# Patient Record
Sex: FEMALE | Race: WHITE | Employment: FULL TIME | URBAN - METROPOLITAN AREA
[De-identification: names, ages, dates, MRNs, and addresses within clinical notes are randomized per-mention and may not be internally consistent; named-entity substitution may affect disease eponyms.]

---

## 2022-04-15 ENCOUNTER — NURSE NAVIGATOR (OUTPATIENT)
Dept: CASE MANAGEMENT | Age: 67
End: 2022-04-15

## 2022-04-15 NOTE — NURSE NAVIGATOR
[address] [facility]   Breast Navigator Encounter    Name: Ronak Palma  Age: 61 y.o.  : 1955  Diagnosis: Right breast IDC; ER+/HI+/HER2-    Encounter type:  [x]Initial Navigator Encounter  []Patient Initiated  []Navigator Follow-up []Pre-op  []Post-op []Check-in Prior to First Treatment []Treatment Modality Change   []Other:     Narrative:   Pt scheduled to see Dr. Aiden Altman on Monday, , at 1pm. Per records from Dr. Chauhan [facility] office, it appears Oncotype was ordered on . Called [employer] to inquire about expected result time. Rep states that results are expected on . She states they received the specimen on  but are awaiting auth from the ordering provider's office. Per rep, this request was sent to Dr. Chauhan [facility] office this morning. Called pt to discuss rescheduling to a date once Oncotype is back. No answer at this time. Left VM.        Interdisciplinary Team:  Med-Onc: Dr. Samir Moore MD  Surg-Onc: Dr. Snehal Holloway MD  Rad-Onc:  Plastics:  :   Nurse Navigator:  Ty Bob, SELVIN HopperN, RN, VIA [facility]  Breast Cancer Nurse [address] [place] [address] Nw  W: [phone]  F: [phone]  [email or web address]   Good Help to Those in [facility]

## 2022-04-18 ENCOUNTER — NURSE NAVIGATOR (OUTPATIENT)
Dept: CASE MANAGEMENT | Age: 67
End: 2022-04-18

## 2022-04-18 NOTE — NURSE NAVIGATOR
[employer]  Breast Navigator Encounter    Name: Linette Ortiz  Age: 61 y.o.  : 1955  Diagnosis: Right breast IDC; ER+/MO+/HER2-    Encounter type:  [x]Initial Navigator Encounter  []Patient Initiated  [x]Navigator Follow-up []Pre-op  []Post-op []Check-in Prior to First Treatment []Treatment Modality Change   []Other:     Narrative:   Pt returned call this morning. Introduced self and discussed ordered Oncotype and upcoming med/onc consult. She verbalizes understanding and is okay with rescheduling appointment for when Oncotype result is back. Appointment with Dr. Faith Laura rescheduled to  at 4:15pm. Pt updated by Erica Carr RN.        Interdisciplinary Team:  Med-Onc: Dr. Ailyn Robles MD  Surg-Onc: Dr. Martin Marino MD  Rad-Onc:  Plastics:  :   Nurse Navigator:  Jelly Mckeon, SELVIN SantosN, RN, VIA [facility]  Breast Cancer Nurse [address] [place] [address] Nw  W: [phone]  F: [phone]  [email or web address]   Good Help to Those in [facility]

## 2022-04-26 ENCOUNTER — HOSPITAL ENCOUNTER (OUTPATIENT)
Dept: RADIATION THERAPY | Age: 67
Discharge: HOME | End: 2022-04-26

## 2022-04-26 VITALS — BODY MASS INDEX: 18.83 KG/M2 | HEIGHT: 65 IN | WEIGHT: 113 LBS

## 2022-05-10 ENCOUNTER — OFFICE VISIT (OUTPATIENT)
Dept: ONCOLOGY | Age: 67
End: 2022-05-10
Payer: COMMERCIAL

## 2022-05-10 ENCOUNTER — DOCUMENTATION ONLY (OUTPATIENT)
Dept: ONCOLOGY | Age: 67
End: 2022-05-10

## 2022-05-10 VITALS
WEIGHT: 116 LBS | BODY MASS INDEX: 19.33 KG/M2 | HEART RATE: 60 BPM | RESPIRATION RATE: 18 BRPM | HEIGHT: 65 IN | SYSTOLIC BLOOD PRESSURE: 141 MMHG | OXYGEN SATURATION: 97 % | DIASTOLIC BLOOD PRESSURE: 82 MMHG | TEMPERATURE: 98 F

## 2022-05-10 DIAGNOSIS — Z17.0 MALIGNANT NEOPLASM OF CENTRAL PORTION OF RIGHT BREAST IN FEMALE, ESTROGEN RECEPTOR POSITIVE (HCC): Primary | ICD-10-CM

## 2022-05-10 DIAGNOSIS — C50.111 MALIGNANT NEOPLASM OF CENTRAL PORTION OF RIGHT BREAST IN FEMALE, ESTROGEN RECEPTOR POSITIVE (HCC): Primary | ICD-10-CM

## 2022-05-10 PROCEDURE — 99204 OFFICE O/P NEW MOD 45 MIN: CPT | Performed by: INTERNAL MEDICINE

## 2022-05-10 RX ORDER — ANASTROZOLE 1 MG/1
1 TABLET ORAL DAILY
Qty: 90 TABLET | Refills: 3 | Status: SHIPPED | OUTPATIENT
Start: 2022-05-10

## 2022-05-10 NOTE — PATIENT INSTRUCTIONS
Anastrozole (By mouth)   Anastrozole (an-AS-troe-zole)  Treats breast cancer. Brand Name(s): Arimidex   There may be other brand names for this medicine. When This Medicine Should Not Be Used: This medicine is not right for everyone. Do not use it if you had an allergic reaction to anastrozole, if you are pregnant, or if you have not stopped menstruating (premenopausal). How to Use This Medicine:   Tablet  · Medicines used to treat cancer are very strong and can have many side effects. Before receiving this medicine, make sure you understand all the risks and benefits. It is important for you to work closely with your doctor during your treatment. · Your doctor will tell you how much medicine to use. Do not use more than directed. · Read and follow the patient instructions that come with this medicine. Talk to your doctor or pharmacist if you have any questions. · Missed dose: Take a dose as soon as you remember. If it is almost time for your next dose, wait until then and take a regular dose. Do not take extra medicine to make up for a missed dose. · If you vomit after taking your medicine, call your doctor or pharmacist for instructions. · Store the medicine in a closed container at room temperature, away from heat, moisture, and direct light. · Ask your pharmacist, doctor, or health caregiver about the best way to dispose of any leftover medicine after you have finished your treatment. You will also need to throw away old medicine after the expiration date has passed. Drugs and Foods to Avoid:   Ask your doctor or pharmacist before using any other medicine, including over-the-counter medicines, vitamins, and herbal products. · Some medicines can affect how anastrozole works.  Tell your doctor if you are taking any of the following:   ¨ Medicine that contains estrogen  ¨ Tamoxifen  Warnings While Using This Medicine:   · Pregnancy after menopause is not likely, but if you think you could be pregnant, tell your doctor. This medicine could harm an unborn baby. · Tell your doctor if you are breastfeeding, or if you have liver disease, bone problems (such as osteoporosis), high cholesterol, or heart disease. · This medicine may cause the following problems:   ¨ Increased risk for weak bones or osteoporosis  ¨ Increased cholesterol levels  ¨ Increased risk for heart attack or stroke  · Your doctor will do lab tests at regular visits to check on the effects of this medicine. Keep all appointments. · Cancer medicine can cause nausea or vomiting, sometimes even after you receive medicine to prevent these effects. Ask your doctor or nurse about other ways to control any nausea or vomiting that might happen. · Keep all medicine out of the reach of children. Never share your medicine with anyone. Possible Side Effects While Using This Medicine:   Call your doctor right away if you notice any of these side effects:  · Allergic reaction: Itching or hives, swelling in your face or hands, swelling or tingling in your mouth or throat, chest tightness, trouble breathing  · Back, bone, joint, or muscle pain  · Blistering, peeling, or red skin rash  · Chest pain or shortness of breath  · Fever, chills, cough, sore throat, and body aches  · Numbness, tingling, or burning pain in your hands, arms, legs, or feet  · Rapid weight gain, or swelling in your hands, ankles, or feet  · Severe diarrhea, nausea, or vomiting  · Vaginal bleeding or discharge  · Yellowing of your skin or the whites of your eyes  If you notice these less serious side effects, talk with your doctor:   · Breast pain  · Dizziness, headache, tiredness, or weakness  · Mood changes, anxiety, or irritability  · Trouble sleeping  · Warmth or redness in your face, neck, arms, or upper chest  If you notice other side effects that you think are caused by this medicine, tell your doctor. Call your doctor for medical advice about side effects.  You may report side effects to FDA at 5-770-FDA-0566  © 2017 [facility] [place][note 2] Information is for End User's use only and may not be sold, redistributed or otherwise used for commercial purposes. The above information is an  only. It is not intended as medical advice for individual conditions or treatments. Talk to your doctor, nurse or pharmacist before following any medical regimen to see if it is safe and effective for you.

## 2022-05-10 NOTE — PROGRESS NOTES
[facility] [place] [address] [address] [facility] [address] [facility]  Linh Salamanca: [phone]  F: [phone]      Reason for Visit:   Ethel Villafana is a 61 y.o. female who is seen in consultation at the request of Dr. Tiana Vaz for evaluation of therapy for breast cancer    Rad onc: Dr. Verito Cook    Treatment History:   · 1/24/22 right breast 12:00, 8cmfn, core bx:  IDC, 4 mm, gr 2, ER + at 93%, RI + 84%, HER 2 negative (IHC 1+, FISH ratio 1; sig/cell 2.2), ki67 40%  · 2/11/22:  invitae genetic testing:  Negative (56 genes)  · 3/31/22 right breast lumpectomy:  IDC, 1.3 cm, gr 1, 0/4 LN, pT1c pN0 cM0  · oncotype DX = 6, 3% RR    History of Present Illness: An abnormal mammogram led to the path above    FH: mother, MGM, maternal aunt with breast cancer; no ovarian, pancreas cancer; prostate cancer in her father    Past Medical History:   Diagnosis Date    Cancer [facility])       History reviewed. No pertinent surgical history. Social History     Tobacco Use    Smoking status: Never Smoker    Smokeless tobacco: Never Used   Substance Use Topics    Alcohol use: Not on file      History reviewed. No pertinent family history. Current Outpatient Medications   Medication Sig    docosahexaenoic acid/epa (FISH OIL PO) Take 1 Capsule by mouth daily.  Flaxseed Oil oil 1 Capsule by Does Not Apply route daily.  calcium carbonate (CALCIUM 500 PO) Take 1 Tablet by mouth daily. No current facility-administered medications for this visit. No Known Allergies     Review of Systems: A complete review of systems was obtained, negative except as described above.     Physical Exam:     Visit Vitals  BP (!) 141/82   Pulse 60   Temp 98 °F (36.7 °C) (Temporal)   Resp 18   Ht 5' 5\" (1.651 m)   Wt 116 lb (52.6 kg)   SpO2 97%   BMI 19.30 kg/m²     ECOG PS: 0    Constitutional: Appears well-developed and well-nourished in no apparent distress      Mental status: Alert and awake, Oriented to person/place/time, Able to follow commands    Eyes: EOM normal, Sclera normal, No visible ocular discharge    HENT: Normocephalic, atraumatic    Mouth/Throat: Moist mucous membranes    External Ears normal    Neck: No visualized mass    Pulmonary/Chest: Respiratory effort normal, No visualized signs of difficulty breathing or respiratory distress    Musculoskeletal: Normal gait with no signs of ataxia, Normal range of motion of neck    Neurological: No facial asymmetry (Cranial nerve 7 motor function), No gaze palsy    Skin: No significant exanthematous lesions or discoloration noted on facial skin    Psychiatric: Normal affect, No hallucinations               Results:   No results found for: WBC, HGB, HCT, PLT, MCV, ANEU, HGBPOC, HCTPOC, HGBEXT, HCTEXT, PLTEXT  No results found for: NA, K, CL, CO2, GLU, BUN, CREA, GFRAA, GFRNA, CA, NAPOC, KPOCT, CLPOC, GLUCPOC, IBUN, CREAPOC, ICAI  No results found for: TBILI, ALT, AP, TP, ALB, GLOB    3/8/22 MRI bx  Not able to identify area in L breast, follow up MRI in 6-12 months    2/8/22 MRI breast bilateral  Right:  1.3 cm mass upper central breast  Left:  0.9 cm inner central aspect of the L breast    Records reviewed and summarized above. Pathology report(s) reviewed above. Radiology report(s) reviewed above. Assessment/plan:   1. Right breast central IDC, 1.3 cm, gr 1, 0/4, ER +, NH +, HER 2 negative, stage IA both anatomic and prognostic  oncotype Dx = 6    We explained to the patient that the goal of systemic adjuvant therapy is to improve the chances for cure and decrease the risk of relapse. We explained why a patient can have microscopic cancer spread now even though physical examination, laboratory studies and imaging studies are negative for cancer. We explained that the same treatments used now as adjuvant or preventive treatments rarely if ever are curative in women who develop metastases. We discussed the potential value of OncotypeDx testing.  The patient was told that this test uses genetic information from the patients own breast cancer tissue to estimate the ten year risk of distant metastases if she takes tamoxifen. The patient was also told that this test is most appropriate in patients with node-negative breast cancer that is estrogen-receptor positive. Emerging data suggest it can also be helpful in women with 1-3 postive nodes. The patient was told that this test can help in decisions concerning the potential benefits of chemotherapy given in addition to hormone therapy like tamoxifen. She was also told that not all insurers reimburse for this test and that we would contact her insurer ahead of time to find out about reimbursement as the cost of the test is about $4,000. In addition we discussed the Jack and Jakeâ€™s trial which uses the OncotypeDx test to determine risk of recurrence. The patient was told that for low risk patients chemotherapy was not recommended, that for patients at intermediate risk there was a randomization to chemotherapy or not, and that for high-risk patients chemotherapy was recommended. The trial showed that for most patients, there was not a benefit of chemotherapy in the intermediate group (< 26 RS if > 48years old). Low oncotype, no indication for chemo    The risks and benefits of tamoxifen were discussed in detail and the patient was informed of the following: Risks include a 1% risk of endometrial cancer for postmenopausal women treated for five years but no (or a minimally increased) risk in premenopausal women and that most women who develop tamoxifen-associated endometrial cancer can be cured. Any bleeding in a postmenopausal woman should be reported to a health care professional. There is also a 1% risk of blood clots (thromboembolism) that can be fatal. All patients irrespective of age who take tamoxifen and who have not had a hysterectomy should have a pelvic exam and Pap smear yearly.  Tamoxifen increases the risk of cataract formation and on rare occasions has caused retinal damage: an eye exam is recommended yearly. Other risks include vaginal discharge or dryness, the development or worsening of hot flashes or vasomotor symptoms, and bone loss in premenopausal women. There is excellent evidence that tamoxifen does not increase risk of depression, cause weight gain or have a major effect on sexual function. Available data suggests little or no effect on cognitive function. Benefits include a lowering of cholesterol and a reduction in the rate of bone loss for postmenopausal woman. Any other symptoms should be reported. The risks and benefits of aromatase inhibitors (anastrozole, letrozole, and exemestane) were discussed in detail and the patient was informed of the following: Risks include the development of painful muscles and joints (arthralgia/myalgia) and bone loss. Muscle and joint pain can be severe but rarely result in any tissue damage; symptoms usually resolve in several weeks when the medication is stopped. Bone loss is common and a bone density test is recommended as a baseline and then yearly to every several years depending on initial results. The risk of fractures is increased by a few percent in patients taking these drugs, but careful monitoring of bone density and using bone protecting agents when indicated can minimize these risks. Unlike tamoxifen there is no increased risk of blood clots or endometrial cancer. AIs can cause or worsen vaginal dryness but women using these drugs should not use vaginal estrogen preparations for these symptoms. AIs can also cause or increase hot flashes. Any other symptoms should be reported. Follow-up after early breast cancer was discussed. I recommend follow-up as defined by the American Society of Clinical Oncology and [place].  This includes a visit to a health care professional every 3-6 months for 3 years, then every 6-12 months for 2 years, and then yearly as well as mammograms yearly. She will get her dexa soon with Dr. Vanessa Peña    After this discussion, she is agreeable to anastrozole 1 mg daily, rx in. She will start this 1 week after the completion of XRT. rx in.        2. Emotional well being:  She has excellent support and is coping well with her disease    3. Uterine polyp: to be removed by Dr. Vanessa Peña    4. L breast lesion: To be followed on repeat MRI    I appreciate the opportunity to participate in Ms. Skye Sharpe's care. Signed By: Mnaav Nieto MD      No orders of the defined types were placed in this encounter.

## 2022-05-11 NOTE — PROGRESS NOTES
Oncology Social Work  Psychosocial Assessment    Reason for Assessment:      [] Social Work Referral [x] Initial Assessment [x] New Diagnosis [] Other    Advance Care Planning: none on file  No flowsheet data found. Sources of Information:    [x]Patient  []Family  [x]Staff  [x]Medical Record    Mental Status:    [x]Alert  []Lethargic  []Unresponsive   [] Unable to assess   Oriented to:  [x]Person  [x]Place  [x]Time  [x]Situation      Relationship Status:  []Single  [x]  []Significant Other/Life Partner  []  []  []    Living Circumstances:  []Lives Alone  [x]Family/Significant Other in Household  []Roommates  []Children in the Home  []Paid Caregivers  []Assisted Living Facility/Group Home  []Skilled [address]  []Homeless  []Incarcerated  []Environmental/Care Concerns  []Other:    Employment Status:  [x]Employed Full-time []Employed Part-time []Homemaker  [] Retired [] Short-Term Disability [] [facility]  [] Unemployed   [] SSI   [] SSDI  [] Self-Employment    Barriers to Learning:    []Language  []Developmental  []Cognitive  []Altered Mental Status  []Visual/Hearing Impairment  []Unable to Read/Write  []Motivational  [] Challenges Understanding Medical Jargon [x]No Barriers Identified      Financial/Legal Concerns:    []Uninsured  []Limited Income/Resources  []Non-Citizen  []Food Insecurity [x]No Concerns Identified   []Other:    Caodaism/Spiritual/Existential:  Does patient have any spiritual or Jehovah's witness beliefs? [x] Yes [] No  Is the patient involved in a spiritual, erin or Jehovah's witness community?  [x] Yes [] No  Patient expressing spiritual/existential angst? [] Yes [x] No  Notes: [facility]:    Identified Support Person/Group: M[place] () & Millie Larsen (daughter)  [x]Strong  []Fair  []Limited    Coping with Illness:   [x]  Coping Well  [] Challenges Coping with Serious Illness [] Situational Depression [] Situational Anxiety [] Anticipatory Grief  [] Recent Loss [] Caregiver [place]            Narrative: Patient here for consultation with Dr. Jayce Torres for the management of breast cancer. Met with patient to introduce oncology social work role and supports. She lives in her private residence with her  Kathy Mcguire) and their adult daughter (Carmen Jose). She works as an . She has health insurance and adequate transportation to appointments if needed. Patient is actively coping with diagnosis. She feels well supported by her  and daughter. Those are the only two people she has told about her breast cancer diagnosis. She is a [employer] and attends [employer]. She tells me this diagnosis has encouraged her examine [facility]". She feels this self-reflection has been a positive outcome. Reviewed barriers to care and none identified at this time. She denied need for support resources. Encouraged her to contact me should she need psychosocial support. She voiced understanding and appreciation. Plan: Patient encouraged to contact social work for psychosocial support as needed. Referral/Handouts:        Thank you,  Glenda Elizabeth LCSW

## 2022-09-13 ENCOUNTER — OFFICE VISIT (OUTPATIENT)
Dept: ONCOLOGY | Age: 67
End: 2022-09-13
Payer: COMMERCIAL

## 2022-09-13 VITALS
TEMPERATURE: 98 F | SYSTOLIC BLOOD PRESSURE: 129 MMHG | RESPIRATION RATE: 18 BRPM | BODY MASS INDEX: 19.56 KG/M2 | HEIGHT: 65 IN | HEART RATE: 81 BPM | DIASTOLIC BLOOD PRESSURE: 78 MMHG | OXYGEN SATURATION: 96 % | WEIGHT: 117.4 LBS

## 2022-09-13 DIAGNOSIS — Z17.0 MALIGNANT NEOPLASM OF CENTRAL PORTION OF RIGHT BREAST IN FEMALE, ESTROGEN RECEPTOR POSITIVE (HCC): Primary | ICD-10-CM

## 2022-09-13 DIAGNOSIS — C50.111 MALIGNANT NEOPLASM OF CENTRAL PORTION OF RIGHT BREAST IN FEMALE, ESTROGEN RECEPTOR POSITIVE (HCC): Primary | ICD-10-CM

## 2022-09-13 PROCEDURE — 99214 OFFICE O/P EST MOD 30 MIN: CPT | Performed by: INTERNAL MEDICINE

## 2022-09-13 PROCEDURE — 1123F ACP DISCUSS/DSCN MKR DOCD: CPT | Performed by: INTERNAL MEDICINE

## 2022-09-13 NOTE — PROGRESS NOTES
Antoine Duane is a 74 y.o. female Follow up for the evaluation of breast cancer. 1. Have you been to the ER, urgent care clinic since your last visit? Hospitalized since your last visit? No    2. Have you seen or consulted any other health care providers outside of the [address] [place] [address] since your last visit? Include any pap smears or colon screening.  No

## 2022-09-13 NOTE — PROGRESS NOTES
[facility] [place] at [address], [address] [facility]  W: [phone]  F: [phone]      Reason for Visit:   Maya Berkowitz is a 74 y.o. female who is seen in follow up for breast cancer    Breast Surgeon: Dr. Núñez Brain onc: Dr. Royanne Klinefelter    Treatment History:   1/24/22 right breast 12:00, 8cmfn, core bx:  IDC, 4 mm, gr 2, ER + at 93%, [place] + 84%, HER 2 negative (IHC 1+, FISH ratio 1; sig/cell 2.2), ki67 40%  2/11/22:  invitae genetic testing:  Negative (56 genes)  3/31/22 right breast lumpectomy:  IDC, 1.3 cm, gr 1, 0/4 LN, pT1c pN0 cM0  oncotype DX = 6, 3% RR  XRT completed 6/7/22  Anastrozole 6/28/22 - current    History of Present Illness: An abnormal mammogram led to the path above    Interval Hx: Patient presents today for follow up on anastrozole. Reports gr 1 fatigue, gr 1 pain to back upon waking rated 3/10. FH: mother, MGM, maternal aunt with breast cancer; no ovarian, pancreas cancer; prostate cancer in her father    Past Medical History:   Diagnosis Date    Cancer [facility])       History reviewed. No pertinent surgical history. Social History     Tobacco Use    Smoking status: Never    Smokeless tobacco: Never   Substance Use Topics    Alcohol use: Not on file      History reviewed. No pertinent family history. Current Outpatient Medications   Medication Sig    anastrozole (ARIMIDEX) 1 mg tablet Take 1 mg by mouth daily. docosahexaenoic acid/epa (FISH OIL PO) Take 1 Capsule by mouth daily. Flaxseed Oil oil 1 Capsule by Does Not Apply route daily. calcium carbonate (CALCIUM 500 PO) Take 1 Tablet by mouth daily. No current facility-administered medications for this visit. No Known Allergies     Review of Systems: A complete review of systems was obtained, negative except as described above.     Physical Exam:     Visit Vitals  /78   Pulse 81   Temp 98 °F (36.7 °C) (Temporal)   Resp 18   Ht 5' 5\" (1.651 m)   Wt 117 lb 6.4 oz (53.3 kg) SpO2 96%   BMI 19.54 kg/m²     ECOG PS: 0    Constitutional: Appears well-developed and well-nourished in no apparent distress      Mental status: Alert and awake, Oriented to person/place/time, Able to follow commands    Eyes: EOM normal, Sclera normal, No visible ocular discharge    HENT: Normocephalic, atraumatic    Mouth/Throat: Moist mucous membranes    External Ears normal    Neck: No visualized mass    Pulmonary/Chest: Respiratory effort normal, No visualized signs of difficulty breathing or respiratory distress    Musculoskeletal: Normal gait with no signs of ataxia, Normal range of motion of neck    Neurological: No facial asymmetry (Cranial nerve 7 motor function), No gaze palsy    Skin: No significant exanthematous lesions or discoloration noted on facial skin    Psychiatric: Normal affect, No hallucinations     Results:   No results found for: WBC, HGB, HCT, PLT, MCV, ANEU, HGBPOC, HCTPOC, HGBEXT, HCTEXT, PLTEXT, HGBEXT, HCTEXT, PLTEXT  No results found for: NA, K, CL, CO2, GLU, BUN, CREA, GFRAA, GFRNA, CA, NAPOC, KPOCT, CLPOC, GLUCPOC, IBUN, CREAPOC, ICAI  No results found for: TBILI, ALT, AP, TP, ALB, GLOB    3/8/22 MRI bx  Not able to identify area in L breast, follow up MRI in 6-12 months    2/8/22 MRI breast bilateral  Right:  1.3 cm mass upper central breast  Left:  0.9 cm inner central aspect of the L breast    Records reviewed and summarized above. Pathology report(s) reviewed above. Radiology report(s) reviewed above. Assessment/plan:   1. 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 She is agreeable to anastrozole 1 mg daily. She started this 6/28/22 and tolerating well with mild hot flashes and joint pain. Continues to follow up with Dr. Amara Pandey. Mammogram scheduled 12/2022.      DEXA with Dr. Robert Henry 5/18/22 was normal.     Follow-up after early breast cancer was discussed. I recommend follow-up as defined by the American Society of Clinical Oncology and [facility]. This includes a visit to a health care professional every 3-6 months for 3 years, then every 6-12 months for 2 years, and then yearly as well as mammograms yearly. 2. Emotional well being:  She has excellent support and is coping well with her disease    3. Uterine polyp: removed by Dr. Connie Agudelo 5/2022 and per patient benign. Will request records. 4. L breast lesion: To be followed on repeat MRI 12/2022    5. Hot flashes: worsened on AI; tolerable    6. Arthralgias: due to AI but tolerable; discussed tart cherry juice     I personally saw and evaluated the patient and performed the entire medical decision making. The history, physical exam, and documentation were performed by Hernandez Callahan NP. I reviewed and verified the above documentation and modified it as needed. Right breast cancer, ER +, on anastrozole, NATHAN, continue, having mild joint pain, advised tart cherry juice. Will order breast MRI for Dec with her mammogram to follow up the left breast.  Dexa was normal.  RTC 6 months    I appreciate the opportunity to participate in Ms. Vaughn Holding Annemarie's care. Signed By: Jenise Us MD      No orders of the defined types were placed in this encounter.

## 2022-12-19 ENCOUNTER — HOSPITAL ENCOUNTER (OUTPATIENT)
Dept: MRI IMAGING | Age: 67
Discharge: HOME | End: 2022-12-19
Attending: NURSE PRACTITIONER
Payer: COMMERCIAL

## 2022-12-19 VITALS — BODY MASS INDEX: 19.3 KG/M2 | WEIGHT: 116 LBS

## 2022-12-19 DIAGNOSIS — C50.111 MALIGNANT NEOPLASM OF CENTRAL PORTION OF RIGHT BREAST IN FEMALE, ESTROGEN RECEPTOR POSITIVE (HCC): ICD-10-CM

## 2022-12-19 DIAGNOSIS — Z17.0 MALIGNANT NEOPLASM OF CENTRAL PORTION OF RIGHT BREAST IN FEMALE, ESTROGEN RECEPTOR POSITIVE (HCC): ICD-10-CM

## 2022-12-19 PROCEDURE — 77030021566

## 2022-12-19 PROCEDURE — 74011250636 HC RX REV CODE- 250/636: Performed by: RADIOLOGY

## 2022-12-19 PROCEDURE — 77049 MRI BREAST C-+ W/CAD BI: CPT

## 2022-12-19 PROCEDURE — A9585 GADOBUTROL INJECTION: HCPCS | Performed by: RADIOLOGY

## 2022-12-19 RX ADMIN — GADOBUTROL 5 ML: 604.72 INJECTION INTRAVENOUS at 12:13

## 2023-03-30 ENCOUNTER — TELEPHONE (OUTPATIENT)
Dept: ONCOLOGY | Age: 68
End: 2023-03-30

## 2023-04-05 ENCOUNTER — HOSPITAL ENCOUNTER (OUTPATIENT)
Dept: RADIATION THERAPY | Age: 68
End: 2023-04-05

## 2023-05-23 RX ORDER — ANASTROZOLE 1 MG/1
1 TABLET ORAL DAILY
COMMUNITY
Start: 2022-05-10 | End: 2023-07-06

## 2023-05-23 RX ORDER — CRANBERRY FRUIT EXTRACT 200 MG
600 CAPSULE ORAL
COMMUNITY

## 2023-07-06 RX ORDER — ANASTROZOLE 1 MG/1
TABLET ORAL
Qty: 90 TABLET | Refills: 3 | Status: SHIPPED | OUTPATIENT
Start: 2023-07-06

## 2023-10-05 ENCOUNTER — TELEPHONE (OUTPATIENT)
Age: 68
End: 2023-10-05

## 2023-10-05 NOTE — TELEPHONE ENCOUNTER
Attempted to call patient to r/s 10/11 appt for 10/12 or 11/9 to only see Shelia between 9:30-1 or 1 month out. No answer; left vm.

## 2023-10-12 ENCOUNTER — OFFICE VISIT (OUTPATIENT)
Age: 68
End: 2023-10-12
Payer: COMMERCIAL

## 2023-10-12 VITALS
TEMPERATURE: 98.1 F | RESPIRATION RATE: 16 BRPM | SYSTOLIC BLOOD PRESSURE: 124 MMHG | HEART RATE: 70 BPM | DIASTOLIC BLOOD PRESSURE: 76 MMHG | BODY MASS INDEX: 18.8 KG/M2 | OXYGEN SATURATION: 98 % | WEIGHT: 113 LBS

## 2023-10-12 DIAGNOSIS — Z17.0 MALIGNANT NEOPLASM OF CENTRAL PORTION OF RIGHT BREAST IN FEMALE, ESTROGEN RECEPTOR POSITIVE (HCC): Primary | ICD-10-CM

## 2023-10-12 DIAGNOSIS — Z79.811 LONG TERM (CURRENT) USE OF AROMATASE INHIBITORS: ICD-10-CM

## 2023-10-12 DIAGNOSIS — C50.111 MALIGNANT NEOPLASM OF CENTRAL PORTION OF RIGHT BREAST IN FEMALE, ESTROGEN RECEPTOR POSITIVE (HCC): Primary | ICD-10-CM

## 2023-10-12 PROCEDURE — G8427 DOCREV CUR MEDS BY ELIG CLIN: HCPCS | Performed by: NURSE PRACTITIONER

## 2023-10-12 PROCEDURE — 99214 OFFICE O/P EST MOD 30 MIN: CPT | Performed by: NURSE PRACTITIONER

## 2023-10-12 PROCEDURE — 3017F COLORECTAL CA SCREEN DOC REV: CPT | Performed by: NURSE PRACTITIONER

## 2023-10-12 PROCEDURE — 1036F TOBACCO NON-USER: CPT | Performed by: NURSE PRACTITIONER

## 2023-10-12 PROCEDURE — G8484 FLU IMMUNIZE NO ADMIN: HCPCS | Performed by: NURSE PRACTITIONER

## 2023-10-12 PROCEDURE — G8420 CALC BMI NORM PARAMETERS: HCPCS | Performed by: NURSE PRACTITIONER

## 2023-10-12 PROCEDURE — 1123F ACP DISCUSS/DSCN MKR DOCD: CPT | Performed by: NURSE PRACTITIONER

## 2023-10-12 PROCEDURE — 1090F PRES/ABSN URINE INCON ASSESS: CPT | Performed by: NURSE PRACTITIONER

## 2023-10-12 PROCEDURE — G8400 PT W/DXA NO RESULTS DOC: HCPCS | Performed by: NURSE PRACTITIONER

## 2023-10-12 RX ORDER — CHLORAL HYDRATE 500 MG
CAPSULE ORAL DAILY
COMMUNITY

## 2023-10-12 NOTE — PROGRESS NOTES
Horace Pickard is a 79 y.o. female here today to follow up for breast cancer    1. Have you been to the ER, urgent care clinic since your last visit? Hospitalized since your last visit?no    2. Have you seen or consulted any other health care providers outside of the [address] [place] [address] since your last visit? Include any pap smears or colon screening.  no

## 2024-06-20 ENCOUNTER — OFFICE VISIT (OUTPATIENT)
Age: 69
End: 2024-06-20
Payer: COMMERCIAL

## 2024-06-20 VITALS
BODY MASS INDEX: 19.3 KG/M2 | OXYGEN SATURATION: 100 % | RESPIRATION RATE: 16 BRPM | DIASTOLIC BLOOD PRESSURE: 76 MMHG | TEMPERATURE: 98.7 F | HEART RATE: 67 BPM | SYSTOLIC BLOOD PRESSURE: 120 MMHG | WEIGHT: 116 LBS

## 2024-06-20 DIAGNOSIS — Z17.0 MALIGNANT NEOPLASM OF CENTRAL PORTION OF RIGHT BREAST IN FEMALE, ESTROGEN RECEPTOR POSITIVE (HCC): Primary | ICD-10-CM

## 2024-06-20 DIAGNOSIS — C50.111 MALIGNANT NEOPLASM OF CENTRAL PORTION OF RIGHT BREAST IN FEMALE, ESTROGEN RECEPTOR POSITIVE (HCC): Primary | ICD-10-CM

## 2024-06-20 DIAGNOSIS — Z79.811 LONG TERM (CURRENT) USE OF AROMATASE INHIBITORS: ICD-10-CM

## 2024-06-20 PROCEDURE — 99214 OFFICE O/P EST MOD 30 MIN: CPT | Performed by: NURSE PRACTITIONER

## 2024-06-20 PROCEDURE — 1123F ACP DISCUSS/DSCN MKR DOCD: CPT | Performed by: NURSE PRACTITIONER

## 2024-06-20 RX ORDER — ANASTROZOLE 1 MG/1
1 TABLET ORAL DAILY
Qty: 90 TABLET | Refills: 3 | Status: SHIPPED | OUTPATIENT
Start: 2024-06-20

## 2024-06-20 NOTE — PROGRESS NOTES
[facility] [place] at [facility]   [address], [address] [facility] [address]   W: [phone]  F: [phone]         Reason for Visit:     Irene Lacy is a 68 y.o. female who is seen in follow up for breast cancer      Breast Surgeon: Dr. Flores   Rad onc: Dr. Hendrix          Treatment History:      1/24/22 right breast 12:00, 8cmfn, core bx:  IDC, 4 mm, gr 2, ER + at 93%, [place] + 84%, HER 2 negative (IHC 1+, FISH ratio 1; sig/cell 2.2),  ki67 40%    2/11/22:  invitae genetic testing:  Negative (56 genes)    3/31/22 right breast lumpectomy:  IDC, 1.3 cm, gr 1, 0/4 LN, pT1c pN0 cM0    oncotype DX = 6, 3% RR    XRT completed 6/7/22    Anastrozole 6/28/22 - current       History of Present Illness:     An abnormal mammogram led to the path above      Interval Hx: Patient presents today for follow up on anastrozole. Reports gr 2 hot flashes, gr 1 insomnia. She also reports mild joint pain to her fingers bilaterally rated 1/10.      FH: mother, MGM, maternal aunt with breast cancer; no ovarian, pancreas cancer; prostate cancer in her father        Review of systems was obtained and pertinent findings reviewed above. Past medical history, social history, family history, medications, and allergies are located in the electronic medical record.         Physical Exam:        Visit Vitals     Vitals:    06/20/24 1237   BP: 120/76   Pulse: 67   Resp: 16   Temp: 98.7 °F (37.1 °C)   SpO2: 100%          ECOG PS: 0      Constitutional: Appears well-developed and well-nourished in no apparent distress        Mental status: Alert and awake, Oriented to person/place/time, Able to follow commands      Eyes: EOM normal, Sclera normal, No visible ocular discharge      HENT: Normocephalic, atraumatic      Mouth/Throat: Moist mucous membranes      External Ears normal      Neck: No visualized mass      Pulmonary/Chest: Respiratory effort normal, No visualized signs of difficulty breathing or respiratory

## 2024-06-20 NOTE — PROGRESS NOTES
[facility] [place] at [facility]  [phone]    06/20/24 Irene Lacy is a 68 y.o. female is here for a follow up for breast cancer.    1. Have you been to the ER, urgent care clinic since your last visit?  Hospitalized since your last visit?No    2. Have you seen or consulted any other health care providers outside of the [facility] since your last visit?  Include any pap smears or colon screening. No

## 2024-12-16 ENCOUNTER — OFFICE VISIT (OUTPATIENT)
Age: 69
End: 2024-12-16
Payer: COMMERCIAL

## 2024-12-16 VITALS
OXYGEN SATURATION: 97 % | SYSTOLIC BLOOD PRESSURE: 133 MMHG | HEIGHT: 65 IN | WEIGHT: 116.8 LBS | HEART RATE: 74 BPM | BODY MASS INDEX: 19.46 KG/M2 | TEMPERATURE: 97.5 F | DIASTOLIC BLOOD PRESSURE: 88 MMHG | RESPIRATION RATE: 18 BRPM

## 2024-12-16 DIAGNOSIS — Z17.0 MALIGNANT NEOPLASM OF CENTRAL PORTION OF RIGHT BREAST IN FEMALE, ESTROGEN RECEPTOR POSITIVE (HCC): ICD-10-CM

## 2024-12-16 DIAGNOSIS — Z79.811 LONG TERM (CURRENT) USE OF AROMATASE INHIBITORS: Primary | ICD-10-CM

## 2024-12-16 DIAGNOSIS — C50.111 MALIGNANT NEOPLASM OF CENTRAL PORTION OF RIGHT BREAST IN FEMALE, ESTROGEN RECEPTOR POSITIVE (HCC): ICD-10-CM

## 2024-12-16 PROCEDURE — 1123F ACP DISCUSS/DSCN MKR DOCD: CPT | Performed by: NURSE PRACTITIONER

## 2024-12-16 PROCEDURE — 99214 OFFICE O/P EST MOD 30 MIN: CPT | Performed by: NURSE PRACTITIONER

## 2024-12-16 RX ORDER — ANASTROZOLE 1 MG/1
1 TABLET ORAL DAILY
Qty: 90 TABLET | Refills: 3 | Status: SHIPPED | OUTPATIENT
Start: 2024-12-16

## 2024-12-16 ASSESSMENT — PATIENT HEALTH QUESTIONNAIRE - PHQ9
2. FEELING DOWN, DEPRESSED OR HOPELESS: NOT AT ALL
SUM OF ALL RESPONSES TO PHQ QUESTIONS 1-9: 0
1. LITTLE INTEREST OR PLEASURE IN DOING THINGS: NOT AT ALL
SUM OF ALL RESPONSES TO PHQ QUESTIONS 1-9: 0
SUM OF ALL RESPONSES TO PHQ9 QUESTIONS 1 & 2: 0

## 2024-12-16 NOTE — PROGRESS NOTES
[facility] [place] at [facility]   [address], [address] [facility] [address]   W: [phone]  F: [phone]         Reason for Visit:     Irene Lacy is a 69 y.o. female who is seen in follow up for breast cancer      Breast Surgeon: Dr. Flores   Rad onc: Dr. Hendrix          Treatment History:      1/24/22 right breast 12:00, 8cmfn, core bx:  IDC, 4 mm, gr 2, ER + at 93%, [place] + 84%, HER 2 negative (IHC 1+, FISH ratio 1; sig/cell 2.2),  ki67 40%    2/11/22:  invitae genetic testing:  Negative (56 genes)    3/31/22 right breast lumpectomy:  IDC, 1.3 cm, gr 1, 0/4 LN, pT1c pN0 cM0    oncotype DX = 6, 3% RR    XRT completed 6/7/22    Anastrozole 6/28/22 - current       History of Present Illness:     An abnormal mammogram led to the path above      Interval Hx: Patient presents today for follow up on anastrozole. Reports gr 1 fatigue, gr 2 hot flashes, gr 1 insomnia.     FH: mother, MGM, maternal aunt with breast cancer; no ovarian, pancreas cancer; prostate cancer in her father        Review of systems was obtained and pertinent findings reviewed above. Past medical history, social history, family history, medications, and allergies are located in the electronic medical record.         Physical Exam:        Visit Vitals     Vitals:    12/16/24 1417   BP: 133/88   Pulse: 74   Resp: 18   Temp: 97.5 °F (36.4 °C)   SpO2: 97%          ECOG PS: 0      Constitutional: Appears well-developed and well-nourished in no apparent distress        Mental status: Alert and awake, Oriented to person/place/time, Able to follow commands      Eyes: EOM normal, Sclera normal, No visible ocular discharge      HENT: Normocephalic, atraumatic      Mouth/Throat: Moist mucous membranes      External Ears normal      Neck: No visualized mass      Pulmonary/Chest: Respiratory effort normal, No visualized signs of difficulty breathing or respiratory distress      Musculoskeletal: Normal gait with no signs of

## 2024-12-16 NOTE — PROGRESS NOTES
Irene Lacy is a 69 y.o. female is here today for a breast cancer follow up.    1. Have you been to the ER, urgent care clinic since your last visit?  Hospitalized since your last visit?No    2. Have you seen or consulted any other health care providers outside of the [facility] since your last visit?  Include any pap smears or colon screening. No